# Patient Record
Sex: FEMALE | Race: WHITE | NOT HISPANIC OR LATINO | Employment: FULL TIME | URBAN - METROPOLITAN AREA
[De-identification: names, ages, dates, MRNs, and addresses within clinical notes are randomized per-mention and may not be internally consistent; named-entity substitution may affect disease eponyms.]

---

## 2018-03-29 ENCOUNTER — TELEPHONE (OUTPATIENT)
Dept: TRANSPLANT | Facility: CLINIC | Age: 63
End: 2018-03-29

## 2018-03-29 NOTE — TELEPHONE ENCOUNTER
Received correspondence and lab results from Herman.  Will send to scan but some were handwritten.    She is on a low dose of insulin via insulin pump (3.6 unit total per day) with 0.6 units as basal and otherwise low dose food coverage.  (basal is 0.025 u/hour of apidra).  She is 'much better' regarding pain sx and takes no pain meds, and reports no pain.  She is on ZenPep 15, 3-4 with meals, and does report 'yes' to constipation, steatorrhea and fatty stools.     She has post prandial hypoglycemia and reports 2 episodes requiring help in past year, none with loss of consciousness or seizure.    Her mixed meal test is   Fasting glucose 96 mg/dL,  Fasting C peptide 1.12 ng/mL  1 hr glucose 102 mg/dL,  1 hr C peptide 4.82 ng/mL  2 hr glucose 66 mg/dL,  1 hr C peptide 1.59 ng/mL    She wears a dexcom and reports her glucose peaked at 212 mg/dL early after drinking boost.      email as below.  Will send remaining labs to scan.  Note a1c of 4.3%.   Partial islet graft function.        email    Herman Serrano.  Thanks so much for sending copies.     In terms of ability to secrete insulin, your islets are working very well overall, but agree, I did note that reactive hypoglycemia is still present.  This is probably a combination of factors, including the islets not having a rapid 'first phase' insulin secretion, then over-reacting with insulin to the high of 212 mg/dL, and also not producing the normal rise in glucagon to the low.   I do no think there is anything that you need to do differently right now based on this.   I am amazed how well your islets do produce insulin after 11 years.  You must be taking good care of yourself.        Gaby Ng MD  [facility] [place]   Pediatric Endocrinology  Director of Research, Islet Autotransplant Program  Phone:  [phone]  Fax:  [phone]  [email or web address]    On Wed, Mar 28, 2018 at 2:43 PM, Herman Licona  "[email or web address]> wrote:  Dr. Ng: I apologize for emailing them; this year, my bad, I did not scan or copy the letter with the fax number as to where to fax the lab results.    Also, the C-Peptide values are hand-written in, as reported from my primary doctor this morning; apparently the hospital lab did do the tests, but did not organize or collate them as we requested. I also hand-wrote in the values that are shaded [out of range] in the CMP, as they are difficult to read.     I am \"ccing\" my endocrinologist, Dr. Keshia Hines and my pump N.P., Ms. Moore, on the results, as well.  We are interested in your take on the performance of my islet cells.    As an aside, since I have the DEXCOM CGM, I could \"track\" the rise in my blood glucose levels, and I had a \"high\" of 212 after taking the high protein Boost; as you can see, I then had a corresponding low of 66.  It seems if I go over 165, I have \"rebound lows.\"    If you need me to fax these results, I can do that, as well, please advise as to the correct fax number.     Best regards,    Herman Licona  [facility]  [address] [place] [address]  T: [phone]  F: [phone]  C: [phone]        "

## 2020-07-22 ENCOUNTER — DOCUMENTATION ONLY (OUTPATIENT)
Dept: ENDOCRINOLOGY | Facility: CLINIC | Age: 65
End: 2020-07-22

## 2020-07-22 NOTE — PROGRESS NOTES
"No, I don t use any basal  Insulin whatsoever and in looking at my pump history, my 7 day daily total bolus average of insulin is 2.34IU per day.   The 14 day average is 2.31 and 30 day average is 3.76. But I follow a pretty strict KETO diet.         On Tue, Jul 21, 2020 at 5:21 PM Gaby Ng MD [email or web address] wrote:    You are doing excellent. I suspect your islet function is probably fairly stable unless you have seen a big change in insulin dosing    On Tue, Jul 21, 2020 at 7:06 PM Herman Licona <[email or web address] wrote:  Where am I in the scheme of things for a patient out 13.5 years from original TP/IAT?    I am sorry that we cannot do the Boost challenge testing; as I know that you use that as a predictor of islet cell function.    Herman [facility]  [address] [place] [address]  T: [phone]  F: [phone]  C: [phone]      STRICTLY CONFIDENTIAL/SUBJECT TO -CLIENT PRIVILEGE/ WORK PRODUCT  Confidentiality Note: This e-mail (and any attachments hereto) is  intended only for the person or entity to which it is addressed and may  contain information that is privileged, confidential or otherwise  protected from disclosure. Dissemination, distribution or copying of  this e-mail or the information contained herein by anyone other than the  intended recipient, or an employee or agent responsible for delivering  this message to the intended recipient, is prohibited. If you have  received this e-mail in error, please notify us immediately (telephone  [phone] ([place] or e-mail \"[email or web address]\")  and destroy the original message and all copies. Thank you.    PLEASE NOTE-COVID-19 PAPER ELIMINATION:    As a result of COVID 19,  we are no longer creating or sending physical documents.  Instead we will serve electronic versions of all documents (including correspondence, pleadings, discovery and document production).  " Likewise, we will accept service of any documents directed to the  in the same way.          We appreciate your understanding and cooperation during this this period of uncertainty.  In the unlikely event you have an objection to receiving electronic service, please let us know and we can discuss alternatives.         Thank you for your cooperation as we all navigate this unprecedented situation.  Stay safe and feel free to reach out (electronically) if you have any questions.            On Tue, Jul 21, 2020 at 4:57 PM Gaby Ng MD [email or web address] wrote:  I m glad it was done (and not missed). I m happy with this A1c.     On Tue, Jul 21, 2020 at 4:06 PM Herman Licona <[email or web address]> wrote:  Dr. Ng: attached is a copy of my 7-2-2020 A1c; the doctor's office had missed printing it out.  It is 4.9; it is higher than normal for me as it reflects 1/2 of the time in the past 90 days I was on steroids or still dealing with the effects of steroids.    I hope this completes my information.    Regards,    Herman   [facility]  [address] [place], [place] [address]  T: [phone]  F: [phone]  C: [phone]

## 2021-04-01 ENCOUNTER — TRANSFERRED RECORDS (OUTPATIENT)
Dept: HEALTH INFORMATION MANAGEMENT | Facility: CLINIC | Age: 66
End: 2021-04-01

## 2021-04-01 LAB
ALT SERPL-CCNC: 45 U/L (ref 13–56)
AST SERPL-CCNC: 31 U/L (ref 15–37)
CREATININE (EXTERNAL): 0.7 MG/DL (ref 0.55–1.02)
GFR ESTIMATED (EXTERNAL): >60 ML/MIN
GLUCOSE (EXTERNAL): 115 MG/DL (ref 74–106)
POTASSIUM (EXTERNAL): 3.1 MMOL/L (ref 3.5–5.1)

## 2021-04-02 ENCOUNTER — TRANSFERRED RECORDS (OUTPATIENT)
Dept: HEALTH INFORMATION MANAGEMENT | Facility: CLINIC | Age: 66
End: 2021-04-02

## 2021-04-12 ENCOUNTER — TRANSFERRED RECORDS (OUTPATIENT)
Dept: HEALTH INFORMATION MANAGEMENT | Facility: CLINIC | Age: 66
End: 2021-04-12

## 2022-03-30 ENCOUNTER — TRANSFERRED RECORDS (OUTPATIENT)
Dept: HEALTH INFORMATION MANAGEMENT | Facility: CLINIC | Age: 67
End: 2022-03-30
Payer: MEDICARE

## 2022-04-08 ENCOUNTER — CARE COORDINATION (OUTPATIENT)
Dept: TRANSPLANT | Facility: CLINIC | Age: 67
End: 2022-04-08
Payer: MEDICARE

## 2022-04-15 DIAGNOSIS — Z00.6 RESEARCH STUDY PATIENT: Primary | ICD-10-CM

## 2022-06-07 PROBLEM — Z90.410 POST-PANCREATECTOMY DIABETES (H): Status: ACTIVE | Noted: 2022-06-07

## 2022-06-07 PROBLEM — E13.9 POST-PANCREATECTOMY DIABETES (H): Status: ACTIVE | Noted: 2022-06-07

## 2022-06-07 PROBLEM — E89.1 POST-PANCREATECTOMY DIABETES (H): Status: ACTIVE | Noted: 2022-06-07

## 2022-06-28 ENCOUNTER — PATIENT OUTREACH (OUTPATIENT)
Dept: GASTROENTEROLOGY | Facility: CLINIC | Age: 67
End: 2022-06-28

## 2022-06-28 NOTE — PROGRESS NOTES
Called pt to discuss clinic with Serafin on 7/20 - Dr. Betancourt now attending, will not be in clinic.     Pt getting GES done 7/8, will call her back tomorrow to discuss details of visit while in [place]    ML

## 2022-06-29 ENCOUNTER — PATIENT OUTREACH (OUTPATIENT)
Dept: GASTROENTEROLOGY | Facility: CLINIC | Age: 67
End: 2022-06-29

## 2022-06-29 NOTE — PROGRESS NOTES
Pt called back to discuss visit with Dr. Betancourt on 7/20- now need to be virtual as Dr. Betancourt attending- pt very disappointed that its now virtual as she's traveling from [place] (however, we're still accommodating the visit)    Agreed to video visit on 7-20 will work on time for visit that works for patient and MD schedule. Pt also wants more details on Internet/wifi at the Research center-will also follow up with patient regarding that. Email sent to pt per her request, again sent [employer] activation via email, stated she had not gotten one beore, encouraged that form of communication.    Email sent to [email or web address]

## 2022-06-30 ENCOUNTER — TRANSFERRED RECORDS (OUTPATIENT)
Dept: HEALTH INFORMATION MANAGEMENT | Facility: CLINIC | Age: 67
End: 2022-06-30

## 2022-07-02 ENCOUNTER — HEALTH MAINTENANCE LETTER (OUTPATIENT)
Age: 67
End: 2022-07-02

## 2022-07-08 ENCOUNTER — TRANSFERRED RECORDS (OUTPATIENT)
Dept: HEALTH INFORMATION MANAGEMENT | Facility: CLINIC | Age: 67
End: 2022-07-08

## 2022-07-19 ENCOUNTER — HOSPITAL ENCOUNTER (OUTPATIENT)
Dept: RESEARCH | Facility: CLINIC | Age: 67
Discharge: HOME | End: 2022-07-19
Attending: OPHTHALMOLOGY
Payer: MEDICARE

## 2022-07-19 ENCOUNTER — ALLIED HEALTH/NURSE VISIT (OUTPATIENT)
Dept: OPHTHALMOLOGY | Facility: CLINIC | Age: 67
End: 2022-07-19
Attending: OPHTHALMOLOGY
Payer: MEDICARE

## 2022-07-19 ENCOUNTER — LAB REQUISITION (OUTPATIENT)
Dept: LAB | Facility: CLINIC | Age: 67
End: 2022-07-19

## 2022-07-19 VITALS — WEIGHT: 125.88 LBS

## 2022-07-19 DIAGNOSIS — Z00.6 RESEARCH STUDY PATIENT: Primary | ICD-10-CM

## 2022-07-19 DIAGNOSIS — Z00.6 EXAMINATION OF PARTICIPANT OR CONTROL IN CLINICAL RESEARCH: Primary | ICD-10-CM

## 2022-07-19 LAB
ALBUMIN SERPL BCG-MCNC: 4.4 G/DL (ref 3.5–5.2)
ALP SERPL-CCNC: 132 U/L (ref 35–104)
ALT SERPL W P-5'-P-CCNC: 27 U/L (ref 10–35)
ANION GAP SERPL CALCULATED.3IONS-SCNC: 12 MMOL/L (ref 7–15)
AST SERPL W P-5'-P-CCNC: 36 U/L (ref 10–35)
BILIRUB SERPL-MCNC: 0.6 MG/DL
BUN SERPL-MCNC: 13.5 MG/DL (ref 8–23)
CALCIUM SERPL-MCNC: 9.1 MG/DL (ref 8.8–10.2)
CHLORIDE SERPL-SCNC: 102 MMOL/L (ref 98–107)
CREAT SERPL-MCNC: 0.62 MG/DL (ref 0.51–0.95)
CYSTATIN C (ROCHE): 0.9 MG/L (ref 0.6–1)
DEPRECATED HCO3 PLAS-SCNC: 25 MMOL/L (ref 22–29)
ERYTHROCYTE [DISTWIDTH] IN BLOOD BY AUTOMATED COUNT: 13.1 % (ref 10–15)
GFR SERPL CREATININE-BSD FRML MDRD: 80 ML/MIN/1.73M2
GFR SERPL CREATININE-BSD FRML MDRD: >90 ML/MIN/1.73M2
GLUCOSE SERPL-MCNC: 89 MG/DL (ref 70–99)
HCT VFR BLD AUTO: 37.4 % (ref 35–47)
HGB BLD-MCNC: 12 G/DL (ref 11.7–15.7)
MCH RBC QN AUTO: 35 PG (ref 26.5–33)
MCHC RBC AUTO-ENTMCNC: 32.1 G/DL (ref 31.5–36.5)
MCV RBC AUTO: 109 FL (ref 78–100)
PLATELET # BLD AUTO: 301 10E3/UL (ref 150–450)
POTASSIUM SERPL-SCNC: 3.4 MMOL/L (ref 3.4–5.3)
PROT SERPL-MCNC: 7 G/DL (ref 6.4–8.3)
RBC # BLD AUTO: 3.43 10E6/UL (ref 3.8–5.2)
SODIUM SERPL-SCNC: 139 MMOL/L (ref 136–145)
WBC # BLD AUTO: 4 10E3/UL (ref 4–11)

## 2022-07-19 PROCEDURE — 82610 CYSTATIN C: CPT | Performed by: PEDIATRICS

## 2022-07-19 PROCEDURE — 99207 PR NO CHARGE COORDINATED CARE PS: CPT

## 2022-07-19 PROCEDURE — 85014 HEMATOCRIT: CPT | Performed by: PEDIATRICS

## 2022-07-19 PROCEDURE — 510N000016 HC RESEARCH MEALS, PER MEAL

## 2022-07-19 PROCEDURE — 510N000017 HC CRU PATIENT CARE, PER 15 MIN

## 2022-07-19 PROCEDURE — 80053 COMPREHEN METABOLIC PANEL: CPT | Performed by: PEDIATRICS

## 2022-07-19 PROCEDURE — 36000 PLACE NEEDLE IN VEIN: CPT

## 2022-07-19 PROCEDURE — 82040 ASSAY OF SERUM ALBUMIN: CPT | Performed by: PEDIATRICS

## 2022-07-19 PROCEDURE — 510N000019 HC RESEARCH MMTT, PER HOUR

## 2022-07-19 PROCEDURE — 510N000009 HC RESEARCH FACILITY, PER 15 MIN

## 2022-07-19 RX ORDER — LIDOCAINE 40 MG/G
CREAM TOPICAL
Status: DISCONTINUED | OUTPATIENT
Start: 2022-07-19 | End: 2022-07-20 | Stop reason: HOSPADM

## 2022-07-19 NOTE — ADDENDUM NOTE
Encounter addended by: Antony Fuentes on: 7/19/2022 2:23 PM   Actions taken: Charge Capture section accepted

## 2022-07-20 ENCOUNTER — OFFICE VISIT (OUTPATIENT)
Dept: NEUROLOGY | Facility: CLINIC | Age: 67
End: 2022-07-20
Payer: COMMERCIAL

## 2022-07-20 ENCOUNTER — LAB REQUISITION (OUTPATIENT)
Dept: LAB | Facility: CLINIC | Age: 67
End: 2022-07-20

## 2022-07-20 ENCOUNTER — HOSPITAL ENCOUNTER (OUTPATIENT)
Dept: RESEARCH | Facility: CLINIC | Age: 67
Discharge: HOME | End: 2022-07-20
Attending: PEDIATRICS | Admitting: PEDIATRICS
Payer: MEDICARE

## 2022-07-20 DIAGNOSIS — Z90.410 POST-PANCREATECTOMY DIABETES (H): Primary | ICD-10-CM

## 2022-07-20 DIAGNOSIS — Z00.6 EXAMINATION OF PARTICIPANT OR CONTROL IN CLINICAL RESEARCH: ICD-10-CM

## 2022-07-20 DIAGNOSIS — E89.1 POST-PANCREATECTOMY DIABETES (H): Primary | ICD-10-CM

## 2022-07-20 DIAGNOSIS — E13.9 POST-PANCREATECTOMY DIABETES (H): Primary | ICD-10-CM

## 2022-07-20 DIAGNOSIS — Z00.6 EXAMINATION OF PARTICIPANT OR CONTROL IN CLINICAL RESEARCH: Primary | ICD-10-CM

## 2022-07-20 LAB
CREAT UR-MCNC: 35.4 MG/DL
MICROALBUMIN UR-MCNC: <12 MG/L
MICROALBUMIN/CREAT UR: NORMAL MG/G{CREAT}

## 2022-07-20 PROCEDURE — 82542 COL CHROMOTOGRAPHY QUAL/QUAN: CPT | Performed by: PEDIATRICS

## 2022-07-20 PROCEDURE — 510N000009 HC RESEARCH FACILITY, PER 15 MIN

## 2022-07-20 PROCEDURE — 99207 PR NO CHARGE-RESEARCH SERVICE: CPT | Performed by: PSYCHIATRY & NEUROLOGY

## 2022-07-20 PROCEDURE — 510N000014 HC RESEARCH GFR, PER GFR

## 2022-07-20 PROCEDURE — 82043 UR ALBUMIN QUANTITATIVE: CPT | Performed by: PEDIATRICS

## 2022-07-20 PROCEDURE — 510N000016 HC RESEARCH MEALS, PER MEAL

## 2022-07-20 PROCEDURE — 255N000002 HC RX 255 OP 636: Performed by: PEDIATRICS

## 2022-07-20 RX ORDER — NICOTINE POLACRILEX 4 MG
15 LOZENGE BUCCAL
Status: DISCONTINUED | OUTPATIENT
Start: 2022-07-20 | End: 2022-07-21 | Stop reason: HOSPADM

## 2022-07-20 RX ORDER — LIDOCAINE 40 MG/G
CREAM TOPICAL
Status: DISCONTINUED | OUTPATIENT
Start: 2022-07-20 | End: 2022-07-20 | Stop reason: CLARIF

## 2022-07-20 RX ADMIN — IOHEXOL 5 ML: 300 INJECTION, SOLUTION INTRAVENOUS at 08:19

## 2022-07-20 NOTE — PROGRESS NOTES
[facility]  Electrodiagnostic Laboratory                 Department of Neurology                                                                                                         Test Date:  2022    Patient: Herman Licona : 1955 Physician: Andre Celaya MD   Sex: Female AGE: 67 year Ref Phys: LIFT study   ID#: [id number]   Technician: none     Clinical Information:  Limited NCS performed as part of the LIFT study. No formal report generated.       ___________________________  Andre Celaya MD        Nerve Conduction Studies  Motor Sites      Latency Amplitude Neg. Amp Diff Segment Distance Velocity Neg. Dur Neg Area Diff Temperature Comment   Site (ms) Norm (mV) Norm %  cm m/s Norm ms %  C    Right Fibular (EDB) Motor   Ankle 4.2  < 6.0 4.8  > 2.0  Ankle-EDB 8   5.1  30.3    Bel Fib Head 10.5 - 4.5 - -6.3 Bel Fib Head-Ankle 32 51  > 38 5.8 4.4 30.1    Pop Fossa 13.3 - 4.3 - -4.4 Pop Fossa-Bel Fib Head 10 36  > 38 5.8 -4.3 30.1    Right Ulnar (ADM) Motor   Wrist 3.5  < 3.5 9.2  > 5.0  Wrist-ADM 8   5.1  33.6    Bel Elbow 7.2 - 8.8 - -4.3 Bel Elbow-Wrist 20 54  > 48 5.1 -7.0 33.6    Abv Elbow 8.6 - 8.6 - -2.3 Abv Elbow-Bel Elbow 8 57  > 48 5.0 -2.8 33.6      Sensory Sites      Onset Lat Peak Lat Amp (O-P) Amp (P-P) Segment Distance Velocity Temperature Comment   Site ms ms  V Norm  V  cm m/s Norm  C    Right Radial Sensory   Forearm-Wrist 1.73 2.4 36  > 15 44 Forearm-Wrist 10 58 - 33.5    Right Sural Sensory   Calf-Lat Mall 2.7 3.6 9  > 5 11 Calf-Lat Mall 14 52  > 38 30.5    Right Ulnar Sensory   Wrist-Dig V 2.6 3.4 30  > 8 59 Wrist-Dig V 12.5 48  > 48 33.1            NCS Waveforms:    Motor         Sensory

## 2022-07-20 NOTE — LETTER
2022       RE: Herman Licona  [address] [facility] [address]     Dear Colleague,    Thank you for referring your patient, Herman Licona, to the [facility] [place] at [facility]. Please see a copy of my visit note below.                        [facility]  Electrodiagnostic Laboratory                 Department of Neurology                                                                                                         Test Date:  2022    Patient: Herman Licona : 1955 Physician: Andre Celaya MD   Sex: Female AGE: 67 year Ref Phys: LIFT study   ID#: [id number]   Technician: none     Clinical Information:  Limited NCS performed as part of the LIFT study. No formal report generated.       ___________________________  Andre Celaya MD        Nerve Conduction Studies  Motor Sites      Latency Amplitude Neg. Amp Diff Segment Distance Velocity Neg. Dur Neg Area Diff Temperature Comment   Site (ms) Norm (mV) Norm %  cm m/s Norm ms %  C    Right Fibular (EDB) Motor   Ankle 4.2  < 6.0 4.8  > 2.0  Ankle-EDB 8   5.1  30.3    Bel Fib Head 10.5 - 4.5 - -6.3 Bel Fib Head-Ankle 32 51  > 38 5.8 4.4 30.1    Pop Fossa 13.3 - 4.3 - -4.4 Pop Fossa-Bel Fib Head 10 36  > 38 5.8 -4.3 30.1    Right Ulnar (ADM) Motor   Wrist 3.5  < 3.5 9.2  > 5.0  Wrist-ADM 8   5.1  33.6    Bel Elbow 7.2 - 8.8 - -4.3 Bel Elbow-Wrist 20 54  > 48 5.1 -7.0 33.6    Abv Elbow 8.6 - 8.6 - -2.3 Abv Elbow-Bel Elbow 8 57  > 48 5.0 -2.8 33.6      Sensory Sites      Onset Lat Peak Lat Amp (O-P) Amp (P-P) Segment Distance Velocity Temperature Comment   Site ms ms  V Norm  V  cm m/s Norm  C    Right Radial Sensory   Forearm-Wrist 1.73 2.4 36  > 15 44 Forearm-Wrist 10 58 - 33.5    Right Sural Sensory   Calf-Lat Mall 2.7 3.6 9  > 5 11 Calf-Lat Mall 14 52  > 38 30.5    Right Ulnar Sensory   Wrist-Dig V 2.6 3.4 30  > 8 59 Wrist-Dig V 12.5 48  > 48 33.1             NCS Waveforms:    Motor         Sensory                       Again, thank you for allowing me to participate in the care of your patient.      Sincerely,    Andre Celaya MD

## 2022-07-20 NOTE — LETTER
Date:July 21, 2022      Provider requested that no letter be sent. Do not send.       [facility]

## 2022-07-26 LAB
BSA: 1.64 M2
COEFF DETERMINATION: 1 %
IOHEXOL CL UR+SERPL-VRATE: 10.95 MG/DL
IOHEXOL CL UR+SERPL-VRATE: 13.1 MG/DL
IOHEXOL CL UR+SERPL-VRATE: 63 ML/MIN
IOHEXOL CL UR+SERPL-VRATE: 66 /1.73 M2
IOHEXOL CL UR+SERPL-VRATE: 7.05 MG/DL
IOHEXOL CL UR+SERPL-VRATE: 8.05 MG/DL
IOHEXOL CL UR+SERPL-VRATE: 9.46 MG/DL

## 2022-07-26 NOTE — ADDENDUM NOTE
Encounter addended by: Fabio Glass RN on: 7/26/2022 8:50 AM   Actions taken: Charge Capture section accepted

## 2022-07-27 ENCOUNTER — DOCUMENTATION ONLY (OUTPATIENT)
Dept: TRANSPLANT | Facility: CLINIC | Age: 67
End: 2022-07-27

## 2022-07-28 NOTE — PROGRESS NOTES
Patient's case discussed at [facility] ([facility]) on 7/27/22. Reviewed imaging and results from recent visits with team. Patient has been referred to local GI provider for follow up; team felt that current reported symptoms are more similar to IBS/IBD than Small Intestine Bacterial Overgrowth (SIBO). Recommended MRE or CTE for follow up as well.     Patient is scheduled to see Dr. Montgomery in clinic in August 2022 for follow up of abdominal symptoms.

## 2022-08-29 ENCOUNTER — VIRTUAL VISIT (OUTPATIENT)
Dept: SURGERY | Facility: CLINIC | Age: 67
End: 2022-08-29
Payer: MEDICARE

## 2022-08-29 VITALS — WEIGHT: 128 LBS | BODY MASS INDEX: 20.09 KG/M2 | HEIGHT: 67 IN

## 2022-08-29 DIAGNOSIS — R10.31 ABDOMINAL PAIN, RIGHT LOWER QUADRANT: Primary | ICD-10-CM

## 2022-08-29 PROCEDURE — 99204 OFFICE O/P NEW MOD 45 MIN: CPT | Mod: 95 | Performed by: SURGERY

## 2022-08-29 RX ORDER — SUMATRIPTAN 100 MG/1
TABLET, FILM COATED ORAL
COMMUNITY
Start: 2021-10-28

## 2022-08-29 RX ORDER — ACYCLOVIR 800 MG/1
TABLET ORAL
COMMUNITY
Start: 2022-07-13

## 2022-08-29 RX ORDER — LOSARTAN POTASSIUM AND HYDROCHLOROTHIAZIDE 12.5; 5 MG/1; MG/1
1 TABLET ORAL DAILY
COMMUNITY
Start: 2022-06-24

## 2022-08-29 RX ORDER — CYPROHEPTADINE HYDROCHLORIDE 4 MG/1
TABLET ORAL
COMMUNITY
Start: 2022-07-02

## 2022-08-29 RX ORDER — CYANOCOBALAMIN 1000 UG/ML
INJECTION, SOLUTION INTRAMUSCULAR; SUBCUTANEOUS
COMMUNITY
Start: 2022-08-03

## 2022-08-29 RX ORDER — TRAMADOL HYDROCHLORIDE 50 MG/1
TABLET ORAL
COMMUNITY
Start: 2022-07-12

## 2022-08-29 RX ORDER — HYDROXYCHLOROQUINE SULFATE 200 MG/1
TABLET, FILM COATED ORAL
COMMUNITY
Start: 2022-07-19

## 2022-08-29 RX ORDER — ALBUTEROL SULFATE 90 UG/1
AEROSOL, METERED RESPIRATORY (INHALATION)
COMMUNITY
Start: 2022-02-04

## 2022-08-29 ASSESSMENT — PAIN SCALES - GENERAL: PAINLEVEL: NO PAIN (0)

## 2022-08-29 NOTE — LETTER
8/29/2022       RE: Herman Licona  [address] [facility] [address]     Dear Colleague,    Thank you for referring your patient, Herman Licona, to the [facility] [place] at [facility]. Please see a copy of my visit note below.    Herman is a 67 year old who is being evaluated via a billable video visit.      How would you like to obtain your AVS? MyChart  If the video visit is dropped, the invitation should be resent by: Text to cell phone: [phone]  Will anyone else be joining your video visit? No      During this virtual visit the patient is located in [place], patient verifies this as the location during the entirety of this visit.     Video-Visit Details    Video Start Time: 0950    Type of service:  Video Visit    Video End Time:1050    Originating Location (pt. Location): Home    Distant Location (provider location):  [facility]     Platform used for Video Visit: OpenSpan     Berry dictated note: [id number]  Visit time 60 min          Again, thank you for allowing me to participate in the care of your patient.      Sincerely,    Ascencion Montgomery MD

## 2022-08-29 NOTE — PROGRESS NOTES
Herman is a 67 year old who is being evaluated via a billable video visit.      How would you like to obtain your AVS? MyChart  If the video visit is dropped, the invitation should be resent by: Text to cell phone: [phone]  Will anyone else be joining your video visit? No      During this virtual visit the patient is located in [place], patient verifies this as the location during the entirety of this visit.     Video-Visit Details    Video Start Time: 0950    Type of service:  Video Visit    Video End Time:1050    Originating Location (pt. Location): Home    Distant Location (provider location):  [facility] SURG[facility]     Platform used for Video Visit: [employer]     See dictated note: [id number]  Visit time 60 min

## 2022-08-29 NOTE — LETTER
2022      RE: Herman Licona  [address]  [facility] [address]       Service Date: 2022    Nick Betancourt MD   [address], [facility] [address]  [place] [address]    RE:      Herman Licona  MRN:  [id number]  :   1955    Dear Dr. Betancourt:    I had the pleasure of seeing your patient, Ms. Herman Licona, in Surgery Clinic today.  This was a video visit from her home in [place].  This 67-year-old female is 15 years out status post a total pancreatectomy and islet autotransplant.  I am seeing her today because of intermittent pain that she has in the lower abdomen.  Twice monthly, she develops pain in the lower abdomen with abdominal distention and spasm, especially in the groin.  This pain is associated frequently with nausea and occasionally with vomiting.  She is currently treating his current pain with a single dose of tramadol, Bentyl and moist heat.  This pain, over several hours, gets better.  The patient has a series of bowel movements that, towards the end, become diarrheal.  She has not had constipation with this pain.    She has tried mineral oil which has not helped and has tried MiraLax on a routine basis which she notes made things worse.  She has had this pain extensively evaluated including with an upper GI/small-bowel follow through, push enteroscopy to evaluate the upper GI tract and a variety of CT scans.  She had a gastric emptying study most recently which showed a gastric emptying that was in the normal range (19 minutes).  She has not been on narcotics since .  Her last HIDA scan was 6 years ago.      ALLERGIES:  She has an extensive list of allergies.  I refer you to her chart for these allergies.      She is unable to take Cipro and Flagyl for small-bowel bacterial overgrowth and has tried rifaximin without benefit.    PHYSICAL EXAMINATION:  Limited due to the video nature of the visit.    GENERAL:  This is a well-developed, well-nourished white female  appearing younger than her stated age of 67.  HEENT:  Without scleral icterus.    I reviewed her most recent laboratories and films including the CT scan that she had about 2 months ago.  On the CT scan, she is status post total pancreatectomy and islet autotransplant.  She has a large stool burden in her colon.    Ms. Licona has right lower quadrant intermittent abdominal pain of uncertain etiology.  I think her differential diagnosis could include poor colonic emptying with obstipation.  The next step in my treatment algorithm would be a prokinetic agent like Linzess.  If we feel strongly that this is the etiology of her pain, I would next consider referring her to a colorectal surgeon for evaluation of colonic emptying problems/obstipation.  I would do this with little enthusiasm.      I am less concerned that this is a small-bowel intestinal overgrowth syndrome, as she did not receive benefit from rifaximin and the symptom spectrum does not seem to be consistent with my typical patient with small-bowel intestinal overgrowth.  I do think that this might be a partial intermittent distal small-bowel obstruction (or proximal small-bowel obstruction).  It would be reasonable to consider an upper GI with a small-bowel follow through.  If there is a concern for Paulie stasis syndrome, a HIDA scan may be reasonable (her last HIDA scan was performed about 6 years ago).  I spent some time with the patient and her , talking through her questions and concerns.  I was quite positively impressed with the patient's thoughtful approach to her problems.  I do think that at the end of her evaluation and courses of treatment, that she would be a patient that would be reasonable to consider for a lysis of adhesions.  I discussed with her the not-excellent results associated with a lysis of adhesions with about one-third of patients sustaining a long-term relief of symptoms after a lysis of adhesions.  I certainly appreciate  the opportunity to visit with this jarek lady and would be happy to be of any assistance in the future.    VISIT TIME:  60 minutes.    Sincerely,      Ascencion Montgomery MD     cc:  MD MIRNA Meade MD [place], [place]          D: 2022   T: 2022   MT: madeleine    Name:     SEDA SHABAZZ  MRN:      [id number]        Account:      [id number]   :      1955           Service Date: 2022       Document: [id number]

## 2022-08-29 NOTE — NURSING NOTE
"Chief Complaint   Patient presents with     RECHECK     Return visit       Vitals:    08/29/22 0910   Weight: 58.1 kg (128 lb)   Height: 1.702 m (5' 7\")       Body mass index is 20.05 kg/m .                          Ab Painting, EMT    "

## 2022-08-29 NOTE — PROGRESS NOTES
Service Date: 2022    Nick Betancourt MD   [facility]  [address], [facility] [address]  [place] [address]    RE:      Herman Licona  MRN:  [id number]  :   1955    Dear Dr. Betancourt:    I had the pleasure of seeing your patient, Ms. Herman Licona, in Surgery Clinic today.  This was a video visit from her home in [place].  This 67-year-old female is 15 years out status post a total pancreatectomy and islet autotransplant.  I am seeing her today because of intermittent pain that she has in the lower abdomen.  Twice monthly, she develops pain in the lower abdomen with abdominal distention and spasm, especially in the groin.  This pain is associated frequently with nausea and occasionally with vomiting.  She is currently treating his current pain with a single dose of tramadol, Bentyl and moist heat.  This pain, over several hours, gets better.  The patient has a series of bowel movements that, towards the end, become diarrheal.  She has not had constipation with this pain.    She has tried mineral oil which has not helped and has tried MiraLax on a routine basis which she notes made things worse.  She has had this pain extensively evaluated including with an upper GI/small-bowel follow through, push enteroscopy to evaluate the upper GI tract and a variety of CT scans.  She had a gastric emptying study most recently which showed a gastric emptying that was in the normal range (19 minutes).  She has not been on narcotics since .  Her last HIDA scan was 6 years ago.      ALLERGIES:  She has an extensive list of allergies.  I refer you to her chart for these allergies.      She is unable to take Cipro and Flagyl for small-bowel bacterial overgrowth and has tried rifaximin without benefit.    PHYSICAL EXAMINATION:  Limited due to the video nature of the visit.    GENERAL:  This is a well-developed, well-nourished white female appearing younger than her stated age of 67.  HEENT:  Without scleral  icterus.    I reviewed her most recent laboratories and films including the CT scan that she had about 2 months ago.  On the CT scan, she is status post total pancreatectomy and islet autotransplant.  She has a large stool burden in her colon.    Ms. Licona has right lower quadrant intermittent abdominal pain of uncertain etiology.  I think her differential diagnosis could include poor colonic emptying with obstipation.  The next step in my treatment algorithm would be a prokinetic agent like Linzess.  If we feel strongly that this is the etiology of her pain, I would next consider referring her to a colorectal surgeon for evaluation of colonic emptying problems/obstipation.  I would do this with little enthusiasm.      I am less concerned that this is a small-bowel intestinal overgrowth syndrome, as she did not receive benefit from rifaximin and the symptom spectrum does not seem to be consistent with my typical patient with small-bowel intestinal overgrowth.  I do think that this might be a partial intermittent distal small-bowel obstruction (or proximal small-bowel obstruction).  It would be reasonable to consider an upper GI with a small-bowel follow through.  If there is a concern for Paulie stasis syndrome, a HIDA scan may be reasonable (her last HIDA scan was performed about 6 years ago).  I spent some time with the patient and her , talking through her questions and concerns.  I was quite positively impressed with the patient's thoughtful approach to her problems.  I do think that at the end of her evaluation and courses of treatment, that she would be a patient that would be reasonable to consider for a lysis of adhesions.  I discussed with her the not-excellent results associated with a lysis of adhesions with about one-third of patients sustaining a long-term relief of symptoms after a lysis of adhesions.  I certainly appreciate the opportunity to visit with this jarek lady and would be happy to be  of any assistance in the future.    VISIT TIME:  60 minutes.    Sincerely,      Ascencion Montgomery MD     cc:  MD MIRNA Meade MD [place], [place] Gregory J. Beilman, MD        D: 2022   T: 2022   MT: madeleine    Name:     SEDA SHABAZZ  MRN:      [id number]        Account:      [id number]   :      1955           Service Date: 2022       Document: [id number]

## 2022-12-26 ENCOUNTER — HEALTH MAINTENANCE LETTER (OUTPATIENT)
Age: 67
End: 2022-12-26

## 2023-04-22 ENCOUNTER — HEALTH MAINTENANCE LETTER (OUTPATIENT)
Age: 68
End: 2023-04-22

## 2023-07-15 ENCOUNTER — HEALTH MAINTENANCE LETTER (OUTPATIENT)
Age: 68
End: 2023-07-15

## 2023-09-23 ENCOUNTER — HEALTH MAINTENANCE LETTER (OUTPATIENT)
Age: 68
End: 2023-09-23

## 2024-02-04 ENCOUNTER — HEALTH MAINTENANCE LETTER (OUTPATIENT)
Age: 69
End: 2024-02-04

## 2024-06-23 ENCOUNTER — HEALTH MAINTENANCE LETTER (OUTPATIENT)
Age: 69
End: 2024-06-23

## 2024-09-01 ENCOUNTER — HEALTH MAINTENANCE LETTER (OUTPATIENT)
Age: 69
End: 2024-09-01

## 2024-11-10 ENCOUNTER — HEALTH MAINTENANCE LETTER (OUTPATIENT)
Age: 69
End: 2024-11-10

## 2025-03-02 ENCOUNTER — HEALTH MAINTENANCE LETTER (OUTPATIENT)
Age: 70
End: 2025-03-02

## 2025-06-21 ENCOUNTER — HEALTH MAINTENANCE LETTER (OUTPATIENT)
Age: 70
End: 2025-06-21